# Patient Record
Sex: FEMALE | Race: WHITE | URBAN - METROPOLITAN AREA
[De-identification: names, ages, dates, MRNs, and addresses within clinical notes are randomized per-mention and may not be internally consistent; named-entity substitution may affect disease eponyms.]

---

## 2017-03-10 ENCOUNTER — OFFICE VISIT (OUTPATIENT)
Dept: FAMILY MEDICINE CLINIC | Facility: CLINIC | Age: 42
End: 2017-03-10

## 2017-03-10 DIAGNOSIS — Z02.9 ENCOUNTERS FOR ADMINISTRATIVE PURPOSE: Primary | ICD-10-CM

## 2017-03-10 NOTE — PROGRESS NOTES
Individual in no acute distress here requesting cerumen be removed from ears. EARS: Canals were clear and free of cerumen, no inflammation or discharge. TM were white, intact, with fluid behind. Bony landmarks were visible.     Individual informed there

## (undated) NOTE — MR AVS SNAPSHOT
[address]  [phone]               Thank you for choosing us for your health care visit with Artemio Geller NP.   We are glad to serve you and happy to provide you with this summary of [email or web address]